# Patient Record
(demographics unavailable — no encounter records)

---

## 2025-02-05 NOTE — REASON FOR VISIT
[Subsequent Evaluation] : a subsequent evaluation for [FreeTextEntry2] : s/p Left canalplasty, meatoplasty, split-thickness skin graft, use of operative microscope. 1/14/25.

## 2025-02-05 NOTE — HISTORY OF PRESENT ILLNESS
[de-identified] : 82 year old male s/p Left canalplasty, meatoplasty, split-thickness skin graft, use of operative microscope. 1/14/25. History of left cholesteatoma and mixed hearing loss. 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  Reports has some mild hearing in left ear.  Report foul odor and draining with intermittent pink drainage since surgery. Reports at left sided pain near tragus radiating to the head 9pm daily- takes gabapentin and tylenol with some relief. Patient's blood pressure was 156/101. Patient was advised to follow up with PCP for high blood pressure.

## 2025-02-05 NOTE — ASSESSMENT
[FreeTextEntry1] : Exam today shows widely patent meatus, extensive new granulation forming around the edges of meatus, medial ear canal and region of external canal opening well epithelialized.  After cleaning today, patient underwent impression for eventual stent.  Once this was completed, topical betamethasone cream and topical powder reapplied to ear canal, Merocel dressing then placed, follow-up 1 week.

## 2025-02-13 NOTE — HISTORY OF PRESENT ILLNESS
[de-identified] : 82 year old male s/p Left canalplasty, meatoplasty, split-thickness skin graft, use of operative microscope. 1/14/25. History of left cholesteatoma and mixed hearing loss. 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 Reports has some hearing in left ear. States has been using ear drops as prescribed. Denies otalgia.   Patient's blood pressure was 156/79. Patient was advised to follow up with PCP for high blood pressure.

## 2025-02-13 NOTE — REASON FOR VISIT
[Subsequent Evaluation] : a subsequent evaluation for [FreeTextEntry2] : follow up s/p Left canalplasty, meatoplasty, split-thickness skin graft, use of operative microscope. 1/14/25.

## 2025-02-13 NOTE — ASSESSMENT
[FreeTextEntry1] : Exam today shows widely patent meatus, good epithelialization of ear canal, no evidence of stenosis.  Merocel dressing replaced after application of topical ciprofloxacin/dexamethasone/clotrimazole/boric acid powder and topical betamethasone cream.  Follow-up 1 week.

## 2025-02-18 NOTE — ASSESSMENT
[FreeTextEntry1] : Exam today shows widely patent left ear canal, Merocel wick replaced today after application of topical betamethasone cream.  Follow-up 1 week for wick removal/replacement, continue Floxin drops until that time.  Earmold may not be ready for another month.

## 2025-02-18 NOTE — HISTORY OF PRESENT ILLNESS
[de-identified] :  82 year old male s/p Left canalplasty, meatoplasty, split-thickness skin graft, use of operative microscope. 1/14/25. History of left cholesteatoma and mixed hearing loss. 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 Topical medication applied in office 02/13/25  Reports has some hearing in left ear. States has been using ear drops as prescribed. States otorrhea is very minimal.  No longer having otalgia.

## 2025-02-25 NOTE — ASSESSMENT
[FreeTextEntry1] : Exam shows widely patent meatus, still some evidence of exposed bone posteriorly, no new granulation, ear canal widely patent.  Merocel stent replaced after administration of topical betamethasone cream and topical ciprofloxacin/dexamethasone/clotrimazole/boric acid powder, follow-up 1 week.  Permanent earmold should be available within 2 weeks.

## 2025-02-25 NOTE — HISTORY OF PRESENT ILLNESS
[de-identified] : 82 year old male with history of left ear canal stenosis. Presents for post op evaluation s/p Left canalplasty, meatoplasty, split-thickness skin graft 1/14/25

## 2025-03-04 NOTE — ASSESSMENT
[FreeTextEntry1] : Some narrowing of left external canal in the past week.  New impression made of ear canal mold today given the impression that had been created no longer fits in canal.  New Merisel replaced in left ear canal to allow for stenting.  Follow-up 1 week for removal/replacement.

## 2025-03-04 NOTE — HISTORY OF PRESENT ILLNESS
[de-identified] : 82 year old male follow up s/p Left canalplasty, meatoplasty, split-thickness skin graft 1/14/25

## 2025-03-04 NOTE — REASON FOR VISIT
[Subsequent Evaluation] : a subsequent evaluation for [FreeTextEntry2] : follow up s/p Left canalplasty, meatoplasty, split-thickness skin graft 1/14/25

## 2025-03-19 NOTE — ASSESSMENT
[FreeTextEntry1] : Exam today shows continued narrowing of left EAC but still patent, Merisel removed from ear, earmold placed.  Recommended wearing earmold at least 12 hours a day, follow-up 2-3 weeks.

## 2025-03-29 NOTE — REASON FOR VISIT
[Subsequent Evaluation] : a subsequent evaluation for [FreeTextEntry2] : s/p Left canalplasty, meatoplasty, split-thickness skin graft 1/14/25.

## 2025-03-29 NOTE — HISTORY OF PRESENT ILLNESS
[de-identified] :  82 year old male follow up s/p Left canalplasty, meatoplasty, split-thickness skin graft 1/14/25

## 2025-03-29 NOTE — ASSESSMENT
[FreeTextEntry1] : Exam shows further constriction of left EAC meatus.  Now only able to fit a size 3 speculum at external canal opening.  New weight placed, resume ofloxacin drops, follow-up 1.5 weeks to remove and replace wick in effort to dilate.

## 2025-03-29 NOTE — HISTORY OF PRESENT ILLNESS
[de-identified] :  82 year old male follow up s/p Left canalplasty, meatoplasty, split-thickness skin graft 1/14/25

## 2025-04-10 NOTE — HISTORY OF PRESENT ILLNESS
[de-identified] : 82 year old male follow up s/p Left canalplasty, meatoplasty, split-thickness skin graft 1/14/25 Wick placed last visit in effort to dilate worsening constriction of left EAC meatus  Denies drainage or otalgia

## 2025-04-10 NOTE — ASSESSMENT
[FreeTextEntry1] : Exam today shows further narrowing of left ear canal, able to fit size for speculum at entrance, Merisel removed and new Merisel replaced.  Maintain dry ear precautions left ear, continue drops, follow-up 2 weeks.

## 2025-05-29 NOTE — PHYSICAL EXAM
[Normal Neck Lymph Nodes] : normal neck lymph nodes  [Normal Supraclavicular Lymph Nodes] : normal supraclavicular lymph nodes [Normal] : oriented to person, place and time, with appropriate affect [de-identified] : left ear canal obstructed [de-identified] : no skin lesions or adenopathy [de-identified] : Normal parotid nodes [de-identified] :  0.6 cm firm raised area right naso-labial fold

## 2025-05-29 NOTE — HISTORY OF PRESENT ILLNESS
[de-identified] : Mr. CHACE CASANOVA is an 82-year-old man here for a follow-up visit.  he noticed a new lesion in the right naso-labial fold  HX of: - s/p excision of a left lower eyelid BCC 11/2015 with negative margins. Required subsequent re excision for recurrence. Pt. presented with a with thickened scar in left infra-orbital area 5 weeks S/P excision. - melanoma of the right arm with negative SLN biopsy in 1998.  -He was seen in our office in March 2017 at which time he was found to have a new ulcerated lesion involving the LEFT external auditory canal. I advised him to apply Effudex ointment to the lesion with subsequent improvement. 7/21/20 reporting that the area has persisted and enlarged. Biopsy was performed and was consistent with basal cell carcinoma, nodular and infiltrative types, ulcerated. At that time he also underwent excision of a lesion involving his chin, which was also consistent with basal cell carcinoma, with negative margins.   9/2020 Biopsy of the nasal septum shows only granulation tissue. -s/p resection of left ear Basal cell carcinoma 09/30/20. Pathology: negative margins.  he now has recurrent basal cell carcinoma of the lower aspect of the left ear both anteriorly and posteriorly  s/p excision of chin skin lesion on 2/28/22. Final Path: Ulcerated basal cell carcinoma of the skin, nodular type, completely excised. Pathology showed basal cell carcinoma with close margins  New ulcerated basal cell carcinoma posterior aspect of left earlobe with full thickness involvement; he now has edema of the left auditory canal with decreased hearing PET/CT 3/10/2023- left periauricular malignant appearing active nodule extending to skin surface, nonenlarged left level 1B mildly active node  s/p excision of left ear BCC w/ tissue rearrangement (Dr. King) 3/22/2023, path: residual BCC, nodular & infiltrative, margins negative, multifocal PNI seen,  left side post auricular skin lesion with intradermal melanocytic nevus - approaches a peripheral inked margin   s/p left ear canal reconstructive surgery 1/2025 w/ Dr Caldera

## 2025-05-29 NOTE — ASSESSMENT
[FreeTextEntry1] : IMP:  82yo M w/ Recurrent basal cell carcinoma of the left ear edema of the left external auditory canal  New lesion right naso-labial fold  s/p excision of left ear BCC w/ tissue rearrangement (Dr. King) 3/22/2023, path: residual BCC, nodular & infiltrative, margins negative, multifocal PNI seen,  left side post auricular skin lesion with intradermal melanocytic nevus - approaches a peripheral inked margin   s/p left meatoplasty & reconstruction of auditory canal & excision 11/2023 w/ Dr. Jake Caldera (ENT)  s/p left ear canal reconstructive surgery 1/2025   Plan: Excision of right facial lesion in ambulatory unit continue F/U with ENT